# Patient Record
Sex: MALE | Race: WHITE | NOT HISPANIC OR LATINO | Employment: OTHER | URBAN - METROPOLITAN AREA
[De-identification: names, ages, dates, MRNs, and addresses within clinical notes are randomized per-mention and may not be internally consistent; named-entity substitution may affect disease eponyms.]

---

## 2018-06-26 ENCOUNTER — ESTABLISHED COMPREHENSIVE EXAM (OUTPATIENT)
Dept: URBAN - METROPOLITAN AREA CLINIC 43 | Facility: CLINIC | Age: 58
End: 2018-06-26

## 2018-06-26 DIAGNOSIS — H52.203: ICD-10-CM

## 2018-06-26 DIAGNOSIS — H52.4: ICD-10-CM

## 2018-06-26 DIAGNOSIS — H52.13: ICD-10-CM

## 2018-06-26 PROCEDURE — 92015 DETERMINE REFRACTIVE STATE: CPT

## 2018-06-26 PROCEDURE — 92012 INTRM OPH EXAM EST PATIENT: CPT

## 2018-06-26 ASSESSMENT — VISUAL ACUITY
OS_BAT: 20/50
OD_SC: J6
OS_CC: 20/30-1
OD_CC: J3
OS_SC: 20/60-2
OD_SC: 20/100-1
OS_CC: J2
OD_BAT: 20/70
OD_CC: 20/30-1
OS_SC: J3

## 2018-06-26 ASSESSMENT — TONOMETRY
OD_IOP_MMHG: 15
OS_IOP_MMHG: 15

## 2020-01-21 NOTE — PATIENT DISCUSSION
New Prescription: Pred Forte (prednisolone acetate): drops,suspension: 1% 1 drop as directed into both eyes 01-

## 2020-01-21 NOTE — PATIENT DISCUSSION
New Prescription: Zymaxid (gatifloxacin): drops: 0.5% 1 drop four times a day as directed into both eyes 01-

## 2020-01-21 NOTE — PATIENT DISCUSSION
CROHN'S DISEASE - DISCUSSED INCREASED RISK OF PROLONGED HEALING AND UNPREDICTABLE VISUAL OUTCOME. MEDICAL CLEARANCE CONFIRMING STABILITY PRIOR TO TREATMENT IS REQUIRED.

## 2020-03-18 ENCOUNTER — ESTABLISHED COMPREHENSIVE EXAM (OUTPATIENT)
Dept: URBAN - METROPOLITAN AREA CLINIC 43 | Facility: CLINIC | Age: 60
End: 2020-03-18

## 2020-03-18 DIAGNOSIS — H53.8: ICD-10-CM

## 2020-03-18 PROCEDURE — 92012 INTRM OPH EXAM EST PATIENT: CPT

## 2020-03-18 PROCEDURE — 92015 DETERMINE REFRACTIVE STATE: CPT

## 2020-03-18 ASSESSMENT — VISUAL ACUITY
OD_SC: J1
OD_CC: 20/20-2
OS_SC: J1
OS_SC: 20/50+2
OD_SC: 20/60-2
OD_CC: J1
OS_CC: J1
OS_CC: 20/20

## 2020-03-18 ASSESSMENT — TONOMETRY
OD_IOP_MMHG: 16
OS_IOP_MMHG: 16

## 2022-06-24 ENCOUNTER — COMPREHENSIVE EXAM (OUTPATIENT)
Dept: URBAN - METROPOLITAN AREA CLINIC 43 | Facility: CLINIC | Age: 62
End: 2022-06-24

## 2022-06-24 DIAGNOSIS — H53.8: ICD-10-CM

## 2022-06-24 DIAGNOSIS — H43.811: ICD-10-CM

## 2022-06-24 DIAGNOSIS — H25.13: ICD-10-CM

## 2022-06-24 PROCEDURE — 92015 DETERMINE REFRACTIVE STATE: CPT

## 2022-06-24 PROCEDURE — 99213 OFFICE O/P EST LOW 20 MIN: CPT

## 2022-06-24 ASSESSMENT — TONOMETRY
OS_IOP_MMHG: 16
OD_IOP_MMHG: 14

## 2022-06-24 ASSESSMENT — VISUAL ACUITY
OS_SC: J3
OS_SC: 20/30-1
OD_SC: J3
OD_SC: 20/30-2

## 2023-06-15 ENCOUNTER — EMERGENCY VISIT (OUTPATIENT)
Dept: URBAN - METROPOLITAN AREA CLINIC 43 | Facility: CLINIC | Age: 63
End: 2023-06-15

## 2023-06-15 DIAGNOSIS — H43.811: ICD-10-CM

## 2023-06-15 DIAGNOSIS — H00.14: ICD-10-CM

## 2023-06-15 PROCEDURE — 92012 INTRM OPH EXAM EST PATIENT: CPT

## 2023-06-15 RX ORDER — NEOMYCIN SULFATE, POLYMYXIN B SULFATE AND DEXAMETHASONE 3.5; 10000; 1 MG/ML; [USP'U]/ML; MG/ML: 1 SUSPENSION OPHTHALMIC

## 2023-06-15 ASSESSMENT — VISUAL ACUITY
OS_SC: 20/20
OD_SC: 20/30